# Patient Record
Sex: FEMALE | Race: WHITE | NOT HISPANIC OR LATINO | URBAN - METROPOLITAN AREA
[De-identification: names, ages, dates, MRNs, and addresses within clinical notes are randomized per-mention and may not be internally consistent; named-entity substitution may affect disease eponyms.]

---
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
## 2021-05-27 ENCOUNTER — AMBULATORY - RIVER FALLS (OUTPATIENT)
Dept: FAMILY MEDICINE | Facility: CLINIC | Age: 21
End: 2021-05-27

## 2022-02-11 VITALS
SYSTOLIC BLOOD PRESSURE: 110 MMHG | BODY MASS INDEX: 24.79 KG/M2 | HEART RATE: 76 BPM | SYSTOLIC BLOOD PRESSURE: 112 MMHG | TEMPERATURE: 98.2 F | WEIGHT: 144.4 LBS | DIASTOLIC BLOOD PRESSURE: 68 MMHG | HEART RATE: 98 BPM | TEMPERATURE: 98 F | BODY MASS INDEX: 24.06 KG/M2 | DIASTOLIC BLOOD PRESSURE: 60 MMHG | HEIGHT: 65 IN | OXYGEN SATURATION: 98 % | WEIGHT: 149 LBS

## 2022-02-11 VITALS
OXYGEN SATURATION: 97 % | HEART RATE: 77 BPM | WEIGHT: 137.48 LBS | SYSTOLIC BLOOD PRESSURE: 120 MMHG | BODY MASS INDEX: 22.09 KG/M2 | TEMPERATURE: 99.2 F | HEIGHT: 66 IN | DIASTOLIC BLOOD PRESSURE: 76 MMHG

## 2022-02-11 VITALS
HEART RATE: 82 BPM | WEIGHT: 149.47 LBS | BODY MASS INDEX: 24.9 KG/M2 | OXYGEN SATURATION: 98 % | DIASTOLIC BLOOD PRESSURE: 64 MMHG | DIASTOLIC BLOOD PRESSURE: 74 MMHG | BODY MASS INDEX: 24.68 KG/M2 | TEMPERATURE: 98.9 F | SYSTOLIC BLOOD PRESSURE: 118 MMHG | BODY MASS INDEX: 24.32 KG/M2 | SYSTOLIC BLOOD PRESSURE: 118 MMHG | OXYGEN SATURATION: 98 % | WEIGHT: 148.15 LBS | WEIGHT: 145.94 LBS | HEIGHT: 65 IN | HEART RATE: 86 BPM | DIASTOLIC BLOOD PRESSURE: 64 MMHG | SYSTOLIC BLOOD PRESSURE: 118 MMHG | HEIGHT: 65 IN | TEMPERATURE: 99.5 F | HEART RATE: 111 BPM | TEMPERATURE: 98.4 F | HEIGHT: 65 IN

## 2022-02-11 VITALS
SYSTOLIC BLOOD PRESSURE: 108 MMHG | DIASTOLIC BLOOD PRESSURE: 62 MMHG | BODY MASS INDEX: 24.46 KG/M2 | HEART RATE: 85 BPM | OXYGEN SATURATION: 97 % | TEMPERATURE: 97.9 F | WEIGHT: 147 LBS

## 2022-02-16 NOTE — PROGRESS NOTES
Patient:   LU VILLA            MRN: [id number]            FIN: [id number]               Age:   17 years     Sex:  Female     :  2000   Associated Diagnoses:   Acute nasopharyngitis; Ecchymosis   Author:   Mayuri Win MD      Chief Complaint   2017 10:39 AM CST   Pt here today for possible strep and has bruses all over legs, unsure how.        History of Present Illness   Chief complaint and symptoms as noted above and confirmed with patient.  Here today with mom.  Cough started Sometimes when she coughs has sore throat.  Not as bad today.  Bruises on her legs.  Unsure when they started or how.  None anywhere else.  No fever.    Bronchitis in the house recently.  Mom and sister with this.        Review of Systems   All other systems are negative      Health Status   Allergies:    Allergic Reactions (Selected)  Severity Not Documented  No Known Medication Allergies (No reactions were documented)   Medications:  (Selected)   Documented Medications  Documented  Nexplanon 68 mg subcutaneous implant: 1 EA ( 68 mg ), subcutaneous, once, Instructions: placed 6/15/16 due for removal by 6/15/19, 0 Refill(s), Type: Maintenance  Strattera: po, qam, 0 Refill(s), Type: Maintenance  Zoloft 100 mg oral tablet: 0.5 tab(s) ( 50 mg ), PO, Daily, # 30 tab(s), 0 Refill(s), Type: Maintenance  guanFACINE: po, 0 Refill(s), Type: Maintenance  risperiDONE: bid, 0 Refill(s), Type: Maintenance   Problem list:    All Problems  ADHD (attention deficit hyperactivity disorder) / 3262758512 / Confirmed  Obesity / 2525793091 / Probable  Reactive attachment disorder / 47799967 / Confirmed  Resolved: *[facility] - Reactive attachment disorder      Histories   Past Medical History:    Active  ADHD (attention deficit hyperactivity disorder) (3366065591)  Reactive attachment disorder (03985798)  Resolved  *[facility]ized[facility] - Reactive attachment disorder: Onset on 2015 at 15 years.  Resolved on 2015 at 15 years.    Family History:    No family history items have been selected or recorded.   Procedure history:    Nexplanon insertion on 6/15/2016 at 16 Years.  Comments:  6/29/2016 3:18 PM - Kim Stoner CMA  consent form signed by pt mother and NCB    Lot: [id number]  Exp: 7/2018    due for removal on or by 6/15/19   Social History:        Tobacco Assessment            Household tobacco concerns: Yes.  Use of tobacco by peers: Yes.        Physical Examination   Vital Signs   12/1/2017 10:39 AM CST Temperature Tympanic 99.2 DegF    Peripheral Pulse Rate 77 bpm    HR Method Electronic    Systolic Blood Pressure 120 mmHg    Diastolic Blood Pressure 76 mmHg    Mean Arterial Pressure 91 mmHg    BP Site Right arm    BP Method Manual    Oxygen Saturation 97 %      Measurements from flowsheet : Measurements   12/1/2017 10:39 AM CST Height Measured - Metric 167.83 cm    Weight Measured - Metric 62.36 kg    BSA - Metric 1.71 m2    Body Mass Index - Metric 22.14 kg/m2    Body Mass Index Percentile 66.08      Vital signs as noted above   General:  Alert and oriented.    Eye:  Pupils are equal, round and reactive to light, Extraocular movements are intact.    HENT:  Tympanic membranes are clear, Oral mucosa is moist, No pharyngeal erythema.    Neck:  No lymphadenopathy.    Respiratory:  Lungs clear to auscultation bilaterally.  Equal air entry.  Symmetrical chest expansion.  No wheezing.  .    Cardiovascular:  S1 and S2 with regular rate and rhythm.  No murmurs.  Pulses 2+ in all four extremities.  Brisk capillary refill.  .    Gastrointestinal:  Positive bowel sounds in all four quadrants.  Abdomen is soft, non-distended, non-tender.  No hepatosplenomegaly.  .    Integumentary:  Multiple large and small bruises at different stages of healing over lower legs.  None noted on arms, trunk area. .       Impression and Plan   Diagnosis     Acute nasopharyngitis (HVF96-PX J00).     Ecchymosis (CNW30-RC R58).     Plan:  Discussed supportive cares  for ongoing URI.   Will check a CBC.    RTC if not improving as expected. .

## 2022-02-16 NOTE — PROGRESS NOTES
Patient:   LU VILLA            MRN: [id number]            FIN: [id number]               Age:   18 years     Sex:  Female     :  2000   Associated Diagnoses:   Immunization due; Well child examination; Reactive attachment disorder   Author:   Mayuri Win MD      Chief Complaint   10/19/2018 1:25 PM CDT   Patient presents for 18yr [facility].      Well Child History   Here today with mom and sister for 18-year wellness exam.  He has no concerns.    Is followed by Dr. Jones for her psychiatric cares.  Is currently seeing him once every 3 months.  Is maintained on Risperdal, Strattera, Zoloft and guanfacine.  Has PRN hydroxyzine for anxiety symptoms.  States her moods are good.  Mom reports she has been more even in the last year or so.    Development: Is a zeferino at [employer] school.  Academically doing well getting A s and B s.  Would like to be a  when she is done with high school.  Currently works at a [employer] in [place].    Sleep: No troubles falling asleep or staying asleep.    Diet: No concerns.    Does need a referral for orthodontics.    Currently is living with a family friend in [place] and not with mom and sisters.    Periods non existant with her nexplanon.  Denies tobacco, alcohol and drug use. Not sexually active.       Review of Systems   Constitutional:  Negative.    Eye:  Negative.    Ear/Nose/Mouth/Throat:  Negative.    Respiratory:  Negative.    Cardiovascular:  Negative.    Gastrointestinal:  Negative.    Genitourinary:  Negative.    Musculoskeletal:  Negative.    Integumentary:  Negative.       Health Status   Allergies:    Allergic Reactions (Selected)  Severity Not Documented  No Known Medication Allergies (No reactions were documented)   Medications:  (Selected)   Documented Medications  Documented  Nexplanon 68 mg subcutaneous implant: 1 EA ( 68 mg ), subcutaneous, once, Instructions: placed 6/15/16 due for removal by 6/15/19, 0 Refill(s), Type: Maintenance  Zoloft 100 mg  oral tablet: 0.5 tab(s) ( 50 mg ), PO, Daily, # 30 tab(s), 0 Refill(s), Type: Maintenance  guanFACINE: po, 0 Refill(s), Type: Maintenance  risperiDONE: bid, 0 Refill(s), Type: Maintenance   Problem list:    All Problems (Selected)  Reactive attachment disorder / 66964042 / Confirmed  Obesity / 6935071415 / Probable  ADHD (attention deficit hyperactivity disorder) / 1175057898 / Confirmed      Histories   Past Medical History:    Active  ADHD (attention deficit hyperactivity disorder) (6808773985)  Reactive attachment disorder (97259032)  Resolved  *[facility] - Reactive attachment disorder: Onset on 12/19/2015 at 15 years.  Resolved on 12/30/2015 at 15 years.   Family History:    No family history items have been selected or recorded.   Procedure history:    Nexplanon insertion on 6/15/2016 at 16 Years.  Comments:  6/29/2016 3:18 PM - Kim Stoner CMA  consent form signed by pt mother and NCB    Lot: [id number]  Exp: 7/2018    due for removal on or by 6/15/19   Social History:        Tobacco Assessment            Household tobacco concerns: Yes.  Use of tobacco by peers: Yes.        Physical Examination   Vital Signs   10/19/2018 1:25 PM CDT Temperature Tympanic 98.4 DegF    Peripheral Pulse Rate 86 bpm    HR Method Electronic    Systolic Blood Pressure 118 mmHg    Diastolic Blood Pressure 64 mmHg    Mean Arterial Pressure 82 mmHg    BP Site Right arm    BP Method Manual      Measurements from flowsheet : Measurements   10/19/2018 1:25 PM CDT Height Measured - Metric 165.10 cm    Weight Measured - Metric 67.2 kg    BSA - Metric 1.76 m2    Body Mass Index - Metric 24.65 kg/m2    Body Mass Index Percentile 81.51      General:  Alert and oriented, No acute distress.    Eye:  Pupils are equal, round and reactive to light, Extraocular movements are intact.    HENT:  Tympanic membranes are clear, Oral mucosa is moist, No pharyngeal erythema.    Neck:  No lymphadenopathy, No thyromegaly.    Respiratory:  Lungs  clear to auscultation bilaterally.  Equal air entry.  Symmetrical chest expansion.  No wheezing.  .    Cardiovascular:  S1 and S2 with regular rate and rhythm.  No murmurs.  Pulses 2+ in all four extremities.  Brisk capillary refill.  .    Gastrointestinal:  Positive bowel sounds in all four quadrants.  Abdomen is soft, non-distended, non-tender.  No hepatosplenomegaly.  .    Genitourinary:  Normal female genitalia.  Jet stage 5 and 5.  .    Musculoskeletal:  Normal gait, Spine straight with forward flexion. .    Integumentary:  No rash.    Neurologic:  Cranial Nerves II-XII are grossly intact, Normal deep tendon reflexes.    Psychiatric:  Appropriate mood & affect, Normal judgment.       Review / Management   PHQ-A:  0/27  Av 7:  0      Impression and Plan   Diagnosis     Immunization due (LRT57-CN Z23).     Well child examination (WNB93-WK Z00.129).     Reactive attachment disorder (TOB16-PE F94.1).     Plan:  Menactra and flu vaccine given today.  We will have mom sign for records or bring us her vaccine records she believes she has a copy at home.  Discussed she has 1 more year left on her Nexplanon and then will need to make an appointment to have it removed and possibly replaced.  Forms signed for orthodontics appointment.  Return to clinic in 1 year..    Orders     Orders (Selected)   Outpatient Orders  Completed  Fluzone Preservative-Free Quadrivalent [id number]: 0.5 mL, IM, once  Menactra: 0.5 mL, IM, once.

## 2022-02-16 NOTE — NURSING NOTE
Comprehensive Intake Entered On:  4/10/2019 12:22 PM CDT    Performed On:  4/10/2019 12:18 PM CDT by Sophie Tate CMA               Summary   Chief Complaint :   Concerns with R ear pain and itching, HA - sx started over weekend. Would like school note for 4/5 appt and today's appt for school.   Sophie Tate CMA - 4/10/2019 12:22 PM CDT     Menstrual Status :   Menarcheal   Weight Measured :   149 lb(Converted to: 149 lb 0 oz, 67.59 kg)    Systolic Blood Pressure :   110 mmHg   Diastolic Blood Pressure :   68 mmHg   Mean Arterial Pressure :   82 mmHg   Peripheral Pulse Rate :   76 bpm   BP Site :   Right arm   Pulse Site :   Radial artery   BP Method :   Manual   HR Method :   Manual   Temperature Tympanic :   98.0 DegF(Converted to: 36.7 DegC)    Sophie Tate CMA - 4/10/2019 12:18 PM CDT   Health Status   Allergies Verified? :   Yes   Medication History Verified? :   Yes   Pre-Visit Planning Status :   Not completed   Sophie Tate CMA - 4/10/2019 12:18 PM CDT   Meds / Allergies   (As Of: 4/10/2019 12:22:44 PM CDT)   Allergies (Active)   No Known Medication Allergies  Estimated Onset Date:   Unspecified ; Created By:   Gayle Robins CMA; Reaction Status:   Active ; Category:   Drug ; Substance:   No Known Medication Allergies ; Type:   Allergy ; Updated By:   Gayle Robins CMA; Reviewed Date:   4/5/2019 12:03 PM CDT        Medication List   (As Of: 4/10/2019 12:22:44 PM CDT)   Prescription/Discharge Order    ethinyl estradiol-levonorgestrel  :   ethinyl estradiol-levonorgestrel ; Status:   Prescribed ; Ordered As Mnemonic:   Lutera 100 mcg-20 mcg oral tablet ; Simple Display Line:   1 tab(s), Oral, daily, 30 tab(s), 11 Refill(s) ; Ordering Provider:   Mayuri Win MD; Catalog Code:   ethinyl estradiol-levonorgestrel ; Order Dt/Tm:   4/5/2019 5:45:36 PM            Home Meds    etonogestrel  :   etonogestrel ; Status:   Documented ; Ordered As Mnemonic:   Nexplanon 68 mg subcutaneous implant ;  Simple Display Line:   68 mg, 1 EA, subcutaneous, once, placed 6/15/16 due for removal by 6/15/19, 0 Refill(s) ; Catalog Code:   etonogestrel ; Order Dt/Tm:   6/29/2016 3:12:50 PM          guanFACINE  :   guanFACINE ; Status:   Documented ; Ordered As Mnemonic:   guanFACINE ; Simple Display Line:   po, 0 Refill(s) ; Catalog Code:   guanFACINE ; Order Dt/Tm:   12/1/2017 10:43:05 AM          sertraline  :   sertraline ; Status:   Documented ; Ordered As Mnemonic:   Zoloft 100 mg oral tablet ; Simple Display Line:   50 mg, 0.5 tab(s), PO, Daily, 30 tab(s), 0 Refill(s) ; Catalog Code:   sertraline ; Order Dt/Tm:   7/20/2015 8:33:23 AM

## 2022-02-16 NOTE — PROGRESS NOTES
Patient:   LU VILLA            MRN: [id number]            FIN: [id number]               Age:   18 years     Sex:  Female     :  2000   Associated Diagnoses:   Sore throat   Author:   Quirino LOCKWOOD, Mayuri      Chief Complaint   2018 12:09 PM CDT   Patient presents for cold-fever top 101.6, nausea, HA, throat sore, sinus congestion, dry cough x few days 0700 Tylenol.      History of Present Illness   Chief complaint and symptoms as noted above and confirmed with patient.  Here today with family friend Muna.  Started with runny nose on Tuesday.  Has gotten worse and had temperature today.  Strep exposure.  Does have a dry cough as well.  Feeling hot with the fever was the worst part.  Is very achy.  Has a very upset stomach.  Stomach flu also in the the home.       Review of Systems   All other systems are negative      Health Status   Allergies:    Allergic Reactions (Selected)  Severity Not Documented  No Known Medication Allergies (No reactions were documented)   Medications:  (Selected)   Documented Medications  Documented  Nexplanon 68 mg subcutaneous implant: 1 EA ( 68 mg ), subcutaneous, once, Instructions: placed 6/15/16 due for removal by 6/15/19, 0 Refill(s), Type: Maintenance  Tylenol Extra Strength 500 mg oral tablet: = 2 tab(s) ( 1,000 mg ), PO, q4hr, PRN: for fever, # 120 tab(s), 0 Refill(s), Type: Maintenance  Zoloft 100 mg oral tablet: 0.5 tab(s) ( 50 mg ), PO, Daily, # 30 tab(s), 0 Refill(s), Type: Maintenance  guanFACINE: po, 0 Refill(s), Type: Maintenance  risperiDONE: bid, 0 Refill(s), Type: Maintenance   Problem list:    All Problems  Reactive attachment disorder / 27959555 / Confirmed  Obesity / 8081452683 / Probable  ADHD (attention deficit hyperactivity disorder) / 6141577416 / Confirmed  Resolved: *[facility] - Reactive attachment disorder      Histories   Past Medical History:    Active  ADHD (attention deficit hyperactivity disorder) (7654257588)  Reactive attachment  disorder (74388307)  Resolved  *[facility] - Reactive attachment disorder: Onset on 12/19/2015 at 15 years.  Resolved on 12/30/2015 at 15 years.   Family History:    No family history items have been selected or recorded.   Procedure history:    Nexplanon insertion on 6/15/2016 at 16 Years.  Comments:  6/29/2016 3:18 PM - Kim Stoner CMA  consent form signed by pt mother and NCB    Lot: [id number]  Exp: 7/2018    due for removal on or by 6/15/19   Social History:        Tobacco Assessment            Household tobacco concerns: Yes.  Use of tobacco by peers: Yes.        Physical Examination   Vital Signs   9/14/2018 12:09 PM CDT Temperature Tympanic 98.9 DegF    Peripheral Pulse Rate 82 bpm    HR Method Electronic    Systolic Blood Pressure 118 mmHg    Diastolic Blood Pressure 74 mmHg    Mean Arterial Pressure 89 mmHg    BP Site Right arm    BP Method Manual    Oxygen Saturation 98 %      Measurements from flowsheet : Measurements   9/14/2018 12:09 PM CDT Height Measured - Metric 165.10 cm    Weight Measured - Metric 66.2 kg    BSA - Metric 1.74 m2    Body Mass Index - Metric 24.29 kg/m2    Body Mass Index Percentile 79.76      Vital signs as noted above   General:  Alert and oriented.    Eye:  Pupils are equal, round and reactive to light, Extraocular movements are intact.    HENT:  Tympanic membranes are clear, Oral mucosa is moist, Mild erythema of pharynx, no exudate. .    Neck:  Few anterior nodes..    Respiratory:  Lungs clear to auscultation bilaterally.  Equal air entry.  Symmetrical chest expansion.  No wheezing.  .    Cardiovascular:  S1 and S2 with regular rate and rhythm.  No murmurs.  Pulses 2+ in all four extremities.  Brisk capillary refill.  .    Gastrointestinal:  Positive bowel sounds in all four quadrants.  Abdomen is soft, non-distended, non-tender.  No hepatosplenomegaly.  .    Integumentary:  No rash.       Review / Management   Results review:  Lab results: 9/14/2018 1:14 PM CDT     Group A Strep POC         NOT DETECTED  .       Impression and Plan   Diagnosis     Sore throat (HGN52-AF J02.9).     Plan:  Await throat culture, will notify family if abnormal.   Discussed supportive cares.   Reviewed limiting Tylenol to 4g per day.    Consider mono testing if not improving by next week.  RTC for signs of dehydration or if not improving as expected. .

## 2022-02-16 NOTE — NURSING NOTE
Comprehensive Intake Entered On:  4/5/2019 12:04 PM CDT    Performed On:  4/5/2019 11:59 AM CDT by Liz Hughes CMA               Summary   Chief Complaint :   Patient prensents for long menstraul cycle and holding urine concerns requesting pelvic exam.   Last Menstrual Period :   3/27/2019 CDT   Menstrual Status :   Menarcheal   Weight Measured - Metric :   65.5 kg(Converted to: 144 lb 6 oz, 144.403 lb)    Height Measured - Metric :   165.10 cm(Converted to: 5 ft 5 in, 5.42 ft, 1.65 m)    Body Mass Index - Metric :   24.03 kg/m2   BSA - Metric :   1.73 m2   Systolic Blood Pressure :   112 mmHg   Diastolic Blood Pressure :   60 mmHg   Mean Arterial Pressure :   77 mmHg   Peripheral Pulse Rate :   98 bpm (HI)    BP Site :   Right arm   BP Method :   Manual   HR Method :   Electronic   Temperature Tympanic :   98.2 DegF(Converted to: 36.8 DegC)    Oxygen Saturation :   98 %   Liz Hughes CMA - 4/5/2019 11:59 AM CDT   Health Status   Allergies Verified? :   Yes   Medication History Verified? :   Yes   Pre-Visit Planning Status :   Completed   Tobacco Use? :   Never smoker   Liz Hughes CMA - 4/5/2019 11:59 AM CDT   Consents   Consent for Immunization Exchange :   Consent Granted   Consent for Immunizations to Providers :   Consent Granted   Liz Hughes CMA - 4/5/2019 11:59 AM CDT   Meds / Allergies   (As Of: 4/5/2019 12:04:49 PM CDT)   Allergies (Active)   No Known Medication Allergies  Estimated Onset Date:   Unspecified ; Created By:   Gayle Robins CMA; Reaction Status:   Active ; Category:   Drug ; Substance:   No Known Medication Allergies ; Type:   Allergy ; Updated By:   Gayle Robins CMA; Reviewed Date:   4/5/2019 12:03 PM CDT        Medication List   (As Of: 4/5/2019 12:04:49 PM CDT)   Home Meds    etonogestrel  :   etonogestrel ; Status:   Documented ; Ordered As Mnemonic:   Nexplanon 68 mg subcutaneous implant ; Simple Display Line:   68 mg, 1 EA, subcutaneous, once, placed 6/15/16  due for removal by 6/15/19, 0 Refill(s) ; Catalog Code:   etonogestrel ; Order Dt/Tm:   6/29/2016 3:12:50 PM          guanFACINE  :   guanFACINE ; Status:   Documented ; Ordered As Mnemonic:   guanFACINE ; Simple Display Line:   po, 0 Refill(s) ; Catalog Code:   guanFACINE ; Order Dt/Tm:   12/1/2017 10:43:05 AM          sertraline  :   sertraline ; Status:   Documented ; Ordered As Mnemonic:   Zoloft 100 mg oral tablet ; Simple Display Line:   50 mg, 0.5 tab(s), PO, Daily, 30 tab(s), 0 Refill(s) ; Catalog Code:   sertraline ; Order Dt/Tm:   7/20/2015 8:33:23 AM

## 2022-02-16 NOTE — NURSING NOTE
Comprehensive Intake Entered On:  6/19/2019 8:54 AM CDT    Performed On:  6/19/2019 8:51 AM CDT by Salena Gonzales LPN               Summary   Chief Complaint :   Nexplanon removal - would like to have replaced as well   Menstrual Status :   Menarcheal   Weight Measured :   147 lb(Converted to: 147 lb 0 oz, 66.68 kg)    Systolic Blood Pressure :   108 mmHg   Diastolic Blood Pressure :   62 mmHg   Mean Arterial Pressure :   77 mmHg   Peripheral Pulse Rate :   85 bpm   BP Site :   Right arm   BP Method :   Manual   Temperature Tympanic :   97.9 DegF(Converted to: 36.6 DegC)    Oxygen Saturation :   97 %   Salena Gonzales LPN - 6/19/2019 8:51 AM CDT   Health Status   Allergies Verified? :   Yes   Medication History Verified? :   Yes   Medical History Verified? :   No   Pre-Visit Planning Status :   Completed   Tobacco Use? :   Never smoker   Salena Gonzales LPN - 6/19/2019 8:51 AM CDT   Meds / Allergies   (As Of: 6/19/2019 8:54:55 AM CDT)   Allergies (Active)   No Known Medication Allergies  Estimated Onset Date:   Unspecified ; Created By:   Gayle Robins CMA; Reaction Status:   Active ; Category:   Drug ; Substance:   No Known Medication Allergies ; Type:   Allergy ; Updated By:   Gayle Robins CMA; Reviewed Date:   4/5/2019 12:03 PM CDT        Medication List   (As Of: 6/19/2019 8:54:55 AM CDT)   Prescription/Discharge Order    ethinyl estradiol-levonorgestrel  :   ethinyl estradiol-levonorgestrel ; Status:   Prescribed ; Ordered As Mnemonic:   Lutera 100 mcg-20 mcg oral tablet ; Simple Display Line:   1 tab(s), Oral, daily, 30 tab(s), 11 Refill(s) ; Ordering Provider:   Mayuri Win MD; Catalog Code:   ethinyl estradiol-levonorgestrel ; Order Dt/Tm:   4/5/2019 5:45:36 PM            Home Meds    sertraline  :   sertraline ; Status:   Documented ; Ordered As Mnemonic:   Zoloft 100 mg oral tablet ; Simple Display Line:   50 mg, 0.5 tab(s), PO, Daily, 30 tab(s), 0 Refill(s) ; Catalog Code:   sertraline  ; Order Dt/Tm:   7/20/2015 8:33:23 AM          etonogestrel  :   etonogestrel ; Status:   Documented ; Ordered As Mnemonic:   Nexplanon 68 mg subcutaneous implant ; Simple Display Line:   68 mg, 1 EA, subcutaneous, once, placed 6/15/16 due for removal by 6/15/19, 0 Refill(s) ; Catalog Code:   etonogestrel ; Order Dt/Tm:   6/29/2016 3:12:50 PM          guanFACINE  :   guanFACINE ; Status:   Documented ; Ordered As Mnemonic:   guanFACINE ; Simple Display Line:   po, 0 Refill(s) ; Catalog Code:   guanFACINE ; Order Dt/Tm:   12/1/2017 10:43:05 AM

## 2022-02-16 NOTE — PROGRESS NOTES
"   Patient:   NATHALIA VILLA            MRN: [id number]            FIN: [id number]               Age:   19 years     Sex:  Female     :  2000   Associated Diagnoses:   Nexplanon insertion; Nexplanon removal   Author:   Shiela Cardozo      Visit Information      Date of Service: 2019 08:39 am  Performing Location: [facility]  Encounter#: [id number]      Primary Care Provider (PCP):  Mayuri Win MD    NPI# [id number]      Procedure   Procedure   Date/ Time:  2019 9:24:00 AM.     Confirmed: patient, procedure, side, site, safety procedures followed, she is here with mom Muna for nexplanon replacement . It has been in place for 3 years and 4 days, she has been very happy with it and had no bleeding except for the last 6 months. She saw Dr GABBIE Win for this and was started on low dose oc and bleeding stopped. mom stopped the oc's (mom is charged with helping Nathalia with her medications) about 2 weeks ago and bleeding/spotting started again.  Nathalia is not sexually active and certainly has not been so in the last 4 days since nexplanon officially '\"  We can defer UPT today but I did caution her do NOT initiated IC in the next 2 weeks giving time for device to be fully active and partners should always use condoms for STi protection.     Performed by: Shiela Cardozo.     Type of procedure: Implanon /Nexplanon removal.     Physical Exam: no relative contraindications found, vital signs Vital Signs   2019 8:51 AM CDT Temperature Tympanic 97.9 DegF    Peripheral Pulse Rate 85 bpm    Systolic Blood Pressure 108 mmHg    Diastolic Blood Pressure 62 mmHg    Mean Arterial Pressure 77 mmHg    BP Site Right arm    BP Method Manual    Oxygen Saturation 97 %      .     Informed consent: signed by patient.     Indication: Pt presents to have Implanon/Nexplanon  implantable birth control device removed.  Reason for Removal:  Patient  about removal risks. No attempt to remove device " will be made if the device is not palpable.  Confirmed no allergies to antiseptic and anesthetic.  Informed consent was given and procedure consent signed and witnessed. Pt was moved to the procedure room and place on cot supine with _____Left________ arm raised and resting above head.  Implanon/Nexplanon device was easily palpable.   The end closest to the elbow was marked with a sterile marker.  The surrounding area was cleaned with Betadine swabs.  The area underneath the end of the implant closest to the elbow was injected with 0.5cc or 1%Lidocaine with epi, being careful to inject under the implant to prevent obscuring or displacing the saad.  By pressing down on the end of the implant closest to the axilla, the tip closest to the elbow was visualized and a 2-3 mm incision in the longitudinal direction of the arm was made here, working toward the elbow..  By Gently pushing the implant toward the incision the tip was visible and then grasped with a sterile mosquito forceps and gently removed.  The entire saad, was removed, measured to confirm it's 4 cm in length, and discarded.  The track for the new device was anesthetized with 0.3 cc additional lido with epi and a new Nexplanon device was inserted and easily palpable by Nathalia and her mother      A butterfly steri strip 1/4 inch wide was used to close the incision and a bandaid and pressure dressing were applied with 4x4 dressings and Coban.  The patient was instructed to keep the dressing in place for 24 hours then she may remove it and keep clean and dry and covered with a bandaid if needed.  The patient was instructed to watch for s/s of infection and to RTC if she has any concerns.    .     Procedure tolerated: well.     Specimen: disposed of, Implanon device discarded.        Impression and Plan   Diagnosis     Nexplanon insertion (GKM63-PN Z30.017).     Nexplanon removal (WCP28-MC Z30.46).     Counseled:  Patient, Regarding diagnosis, Regarding treatment, as  above.

## 2022-02-16 NOTE — TELEPHONE ENCOUNTER
---------------------  From: Flor Waters   Sent: 5/24/2019 2:48:29 PM CDT  Subject: Nexplanon due for removal by 6/15/19     Reminder letter sent to address listed in pt's chart that Nexplanon is due to be removed by 6/15/19.

## 2022-02-16 NOTE — PROGRESS NOTES
Patient:   LU VILLA            MRN: [id number]            FIN: [id number]               Age:   18 years     Sex:  Female     :  2000   Associated Diagnoses:   Folliculitis; Constipation, unspecified; Low back pain; Other specified irregular menstruation   Author:   Mayuri Win MD      Visit Information      Date of Service: 2019 11:40 am  Performing Location: [facility]  Encounter#: [id number]      Primary Care Provider (PCP):  Mayuri Win MD    NPI# [id number]      Chief Complaint   2019 11:59 AM CDT    Patient prensents for long menstraul cycle and holding urine concerns requesting pelvic exam.      History of Present Illness   Chief complaint and symptoms as noted above and confirmed with patient.  Here today with family friend that she stays with.    Period has been irregular, 1 week on one week off. 2-3 tampons a day even when heavy. Some spotting in between periods. Has cramping, she takes Tylenol for this. No vaginal itching. Has Nexplanon, placed 2016. Not sexually active    Pustules on bottom that are itchy. Washes them with face wash. No history of abscesses.     Has urinary leakage from time to time when laughing. BMs twice a week, hard stools that can be painful. Have talked about Kegal exercises.    Has some pain in lower back, is worried it could be due to her mild scoliosis. Works at [employer] around on her feet. Also rides horses. Would like to see PT.       Review of Systems   All other systems are negative      Health Status   Allergies:    Allergic Reactions (Selected)  Severity Not Documented  No Known Medication Allergies (No reactions were documented)   Medications:  (Selected)   Documented Medications  Documented  Nexplanon 68 mg subcutaneous implant: 1 EA ( 68 mg ), subcutaneous, once, Instructions: placed 6/15/16 due for removal by 6/15/19, 0 Refill(s), Type: Maintenance  Zoloft 100 mg oral tablet: 0.5 tab(s) ( 50 mg ), PO, Daily, # 30 tab(s), 0  Refill(s), Type: Maintenance  guanFACINE: po, 0 Refill(s), Type: Maintenance,    Medications          *denotes recorded medication          *Nexplanon 68 mg subcutaneous implant: 68 mg, 1 EA, subcutaneous, once, placed 6/15/16 due for removal by 6/15/19, 0 Refill(s).          *guanFACINE: po, 0 Refill(s).          *Zoloft 100 mg oral tablet: 50 mg, 0.5 tab(s), PO, Daily, 30 tab(s), 0 Refill(s).     Problem list:    All Problems  ADHD (attention deficit hyperactivity disorder) / SNOMED CT 7857898462 / Confirmed  Obesity / SNOMED CT 8459688800 / Probable  Reactive attachment disorder / SNOMED CT 29156988 / Confirmed  Resolved: *[facility] - Reactive attachment disorder      Histories   Past Medical History:    Active  ADHD (attention deficit hyperactivity disorder) (8004149844)  Reactive attachment disorder (47146419)  Resolved  *[facility] - Reactive attachment disorder: Onset on 12/19/2015 at 15 years.  Resolved on 12/30/2015 at 15 years.   Family History:    No family history items have been selected or recorded.   Procedure history:    Nexplanon insertion on 6/15/2016 at 16 Years.  Comments:  6/29/2016 3:18 PM CDT - Kim Stoner CMA  consent form signed by pt mother and NCB    Lot: [id number]  Exp: 7/2018    due for removal on or by 6/15/19   Social History:        Tobacco Assessment            Household tobacco concerns: Yes.  Use of tobacco by peers: Yes.      Physical Examination   Vital Signs   4/5/2019 11:59 AM CDT Temperature Tympanic 98.2 DegF    Peripheral Pulse Rate 98 bpm  HI    HR Method Electronic    Systolic Blood Pressure 112 mmHg    Diastolic Blood Pressure 60 mmHg    Mean Arterial Pressure 77 mmHg    BP Site Right arm    BP Method Manual    Oxygen Saturation 98 %      Measurements from flowsheet : Measurements   4/5/2019 11:59 AM CDT Height Measured - Metric 165.10 cm    Weight Measured - Metric 65.5 kg    BSA - Metric 1.73 m2    Body Mass Index - Metric 24.03 kg/m2    Body Mass  Index Percentile 76.67      Vital signs as noted above   General:  Alert and oriented.    Genitourinary:  Mild amount of white discharge at initroitus. No lesions.    Musculoskeletal:  No tenderness, No deformity.    Integumentary      Impression and Plan   Diagnosis     Folliculitis (YFF55-NP L73.9).     Constipation, unspecified (YSK89-AQ K59.00).     Low back pain (QDZ16-GK M54.5).     Other specified irregular menstruation (VWF96-XW N92.5).     Plan:  Urinary leakage   Will work on constipation first, if this does not help, will send to pelvic    Continue Kegel exercises    Irregular menses   Nexplanon placed 06/2016   Will add OCPs to help regulate periods   Scheduled ibuprofen 2-3 days before start of period    Back pain    Referral made to PT with [facility]   Reassured back pain less likely related to scoliosis       I, Mayuri Win MD, personally performed the services described in this documentation.  The documentation was completed by PA student Rai Nieto, and has been reviewed by me for both accuracy and completeness. .    Orders     Orders (Selected)   Outpatient Orders  Ordered  Referral (Request): 04/05/19 12:19:00 CDT, Referred to: Other, Additional instructions: Physical therapy to evaluate and treat, RE:  low back pain, Low back pain.

## 2022-02-16 NOTE — PROGRESS NOTES
Chief Complaint    Concerns with R ear pain and itching, HA - sx started over weekend. Would like school note for 4/5 appt and today's appt for school.  History of Present Illness      Chief complaint as above reviewed and confirmed with patient.  Pt presents to the clinic with concerns re: R ear pain.  She has had uri sx of rhinorrhea, congestion, PND and scratchy throat for about 3-4 days.  R ear is itchy.  No nausea, vomiting. low grade fever. Mild HA.  Review of Systems      Review of systems is negative with the exception of those noted in HPI          Physical Exam   Vitals & Measurements    T: 98.0   F (Tympanic)  HR: 76(Peripheral)  BP: 110/68     WT: 149 lb           Vitals as above per nursing documentation           Constitutional : nad appears well          Ears: ears patent B, TMS intact, noninjected. R canal is slightly erythematous and edematous, mild discomfort with palpation of the auricle and pinna.           Nose: nasal mucosa is non-edematous. no discharge           Throat: pharynx is nonerythematous, no tonsillar hypertrophy, no exudate           Neck: neck supple, no adenopathy, no thyromegaly, no rigidity           Lungs: lungs CTA', no Wheezes, rhonchi or rales           Heart: heart RRR, nl S1, S2 no murmur           skin:  No rashes              Assessment/Plan       1. Acute URI (J06.9)         conservative measures, may try seudafed and or allergy medication such as zyrtec.  Push fluids, rest and ibuprofen or tylenol for comfort.         2. Right otitis externa (H60.91)         cortisporin otic as ordered.  Pt instructed to return to clinic for persistent or worsening symptoms.                  Orders:         colistin/HC/neomycin/thonzonium otic, 5 drop(s), Ear-Right, QID, # 5 mL, 0 Refill(s), Type: Maintenance, Pharmacy: [id number], 5 drop(s) Ear-Right qid,x7 day(s), (Ordered)  Patient Information     Name:LU VILLA      Address:      [id number]       [place] [address]     Sex:Female     YOB: 2000     [phone]     Emergency Contact:ALIDA VILLA     MRN:[id number]     FIN:[id number]     Location:[facility]     Date of Service:04/10/2019      Primary Care Physician:       Mayuri Win MD, [phone]      Attending Physician:       Jessenia So PA-C, [phone]  Problem List/Past Medical History    Ongoing     ADHD (attention deficit hyperactivity disorder)     Obesity     Reactive attachment disorder    Historical     *[facility] - Reactive attachment disorder  Procedure/Surgical History     Nexplanon insertion (06/15/2016)            Comments: consent form signed by pt mother and NCB      Lot: [id number]      Exp: 7/2018      due for removal on or by 6/15/19.  Medications     Zoloft 100 mg oral tablet: 50 mg, 0.5 tab(s), PO, Daily, 30 tab(s), 0 Refill(s).     Nexplanon 68 mg subcutaneous implant: 68 mg, 1 EA, subcutaneous, once, placed 6/15/16 due for removal by 6/15/19, 0 Refill(s).     guanFACINE: po, 0 Refill(s).     Lutera 100 mcg-20 mcg oral tablet: 1 tab(s), Oral, daily, 30 tab(s), 11 Refill(s).     Cortisporin-TC otic suspension: 5 drop(s), Ear-Right, QID, for 7 day(s), 5 mL, 0 Refill(s).          Allergies    No Known Medication Allergies  Social History    Smoking Status - 04/05/2019     Never smoker     Tobacco      Household tobacco concerns: Yes. Use of tobacco by peers: Yes., 10/31/2016  Immunizations      Vaccine Date Status      influenza virus vaccine, inactivated 10/19/2018 Given      meningococcal conjugate vaccine 10/19/2018 Given

## 2022-02-16 NOTE — LETTER
(Inserted Image. Unable to display)   October 22, 2019        LU VILLA  [address] [place] [phone]        Dear LU,      Thank you for selecting [facility] ([facility] [place] [place] [facility]) for your healthcare needs.    Our records indicate you are due for the following services:     Annual Physical and Gynecologic Exam ~ Yearly wellness exams are important for your ongoing health and wellness.  This exam gives you the opportunity to meet with your Healthcare Provider to review your health, update immunizations and to recommend preventive screenings that you may be due for.  At your wellness visit, your health care provider will determine the need for a gynecologic exam and/or pap smear.     To schedule an appointment or if you have further questions, please contact your primary clinic:   [facility]       [phone]   [facility]       [phone]              [facility]     [phone]      Powered by [employer] and [employer]    Sincerely,    Mayuri Win MD

## 2022-02-16 NOTE — PROGRESS NOTES
Patient:   LU VILLA            MRN: [id number]            FIN: [id number]               Age:   18 years     Sex:  Female     :  2000   Associated Diagnoses:   Canker sores oral; Upper respiratory virus   Author:   Mayuri Win MD      Chief Complaint   2018 12:04 PM CST  Patient presents for possible canker sore-inner middle bottom lip at gum line getting worse x 2 weeks, sore throat, and cold x 3 days      History of Present Illness   Chief complaint and symptoms as noted above and confirmed with patient.  Here today with family friend.  Started with canker sore 2 weeks ago.  Using ambesol but not helping much.  Sore throat for a few days- getting worse.  Has had some cold symptoms.  No fever.  Low grade only.  Sore throat from coughing.  Has some body aches.  Did sleep on the couch as well.  No known sick contacts.  Nyquil and this did help with sleep.  Tylenol helped this am.  Is able to eat and drink.  NO troubles with UOP.        Review of Systems   All other systems are negative      Health Status   Allergies:    Allergic Reactions (Selected)  Severity Not Documented  No Known Medication Allergies (No reactions were documented)   Medications:  (Selected)   Documented Medications  Documented  Nexplanon 68 mg subcutaneous implant: 1 EA ( 68 mg ), subcutaneous, once, Instructions: placed 6/15/16 due for removal by 6/15/19, 0 Refill(s), Type: Maintenance  Zoloft 100 mg oral tablet: 0.5 tab(s) ( 50 mg ), PO, Daily, # 30 tab(s), 0 Refill(s), Type: Maintenance  guanFACINE: po, 0 Refill(s), Type: Maintenance  risperiDONE: bid, 0 Refill(s), Type: Maintenance   Problem list:    All Problems  Reactive attachment disorder / 43575881 / Confirmed  Obesity / 4537012616 / Probable  ADHD (attention deficit hyperactivity disorder) / 2924865483 / Confirmed  Resolved: *[facility] - Reactive attachment disorder      Histories   Past Medical History:    Active  ADHD (attention deficit hyperactivity  disorder) (1521489119)  Reactive attachment disorder (21413830)  Resolved  *[facility] - Reactive attachment disorder: Onset on 12/19/2015 at 15 years.  Resolved on 12/30/2015 at 15 years.   Family History:    No family history items have been selected or recorded.   Procedure history:    Nexplanon insertion on 6/15/2016 at 16 Years.  Comments:  6/29/2016 3:18 PM - Herbie HARRISON Kim  consent form signed by pt mother and NCB    Lot: [id number]  Exp: 7/2018    due for removal on or by 6/15/19   Social History:        Tobacco Assessment            Household tobacco concerns: Yes.  Use of tobacco by peers: Yes.        Physical Examination   Vital Signs   11/20/2018 12:04 PM CST Temperature Tympanic 99.5 DegF    Peripheral Pulse Rate 111 bpm  HI    HR Method Electronic    Systolic Blood Pressure 118 mmHg    Diastolic Blood Pressure 64 mmHg    Mean Arterial Pressure 82 mmHg    BP Site Right arm    BP Method Manual    Oxygen Saturation 98 %      Measurements from flowsheet : Measurements   11/20/2018 12:04 PM CST Height Measured - Metric 165.10 cm    Weight Measured - Metric 67.8 kg    BSA - Metric 1.76 m2    Body Mass Index - Metric 24.87 kg/m2    Body Mass Index Percentile 82.42      Vital signs as noted above   General:  Alert and oriented.    Eye:  Pupils are equal, round and reactive to light, Extraocular movements are intact.    HENT:  Tympanic membranes are clear, Oral mucosa is moist, Mild erythema of pharynx, no exudate. Small ulceration noted at lower lip frenulum..    Neck:  No lymphadenopathy.    Respiratory:  Lungs clear to auscultation bilaterally.  Slightly diminished breath sounds in the right base.  Symmetrical chest expansion.  No wheezing.  .    Cardiovascular:  S1 and S2 with regular rate and rhythm.  No murmurs.  Pulses 2+ in all four extremities.  Brisk capillary refill.  .       Impression and Plan   Diagnosis     Canker sores oral (VRO42-TO K12.0).     Upper respiratory virus (TOC83-QS  J06.9).     Plan:  Reviewed supportive cares for the respiratory illness.  Discussed over-the-counter medications as well as honey and warm liquid before bed.  If her fever spiked up, she has any difficulty breathing or is getting worse she should return for repeat exam and possible chest x-ray.  Topical Orabase with benzocaine for the oral lesion..    Orders     Orders (Selected)   Prescriptions  Prescribed  albuterol 90 mcg/inh inhalation aerosol: See Instructions, Instructions: 2-4 puffs with chamber every  4hours as needed for cough, # 1 EA, 0 Refill(s), Type: Maintenance, Pharmacy: [facility] [id number], 2-4 puffs with chamber every  4hours as needed for cough  antistatic holding chamber with mouthpiece such as aerochamber: antistatic holding chamber with mouthpiece such as aerochamber, See Instructions, Instructions: use with albuterol, Supply, # 1 EA, 0 Refill(s), Type: Maintenance, Pharmacy: [facility] [id number], use with albuterol.